# Patient Record
Sex: FEMALE | Race: ASIAN | NOT HISPANIC OR LATINO
[De-identification: names, ages, dates, MRNs, and addresses within clinical notes are randomized per-mention and may not be internally consistent; named-entity substitution may affect disease eponyms.]

---

## 2022-09-14 ENCOUNTER — APPOINTMENT (OUTPATIENT)
Dept: CARDIOLOGY | Facility: CLINIC | Age: 62
End: 2022-09-14
Payer: MEDICAID

## 2022-09-14 ENCOUNTER — NON-APPOINTMENT (OUTPATIENT)
Age: 62
End: 2022-09-14

## 2022-09-14 VITALS
HEART RATE: 76 BPM | RESPIRATION RATE: 16 BRPM | OXYGEN SATURATION: 96 % | HEIGHT: 61.42 IN | DIASTOLIC BLOOD PRESSURE: 75 MMHG | BODY MASS INDEX: 19.76 KG/M2 | TEMPERATURE: 97.8 F | WEIGHT: 106 LBS | SYSTOLIC BLOOD PRESSURE: 120 MMHG

## 2022-09-14 DIAGNOSIS — Z86.39 PERSONAL HISTORY OF OTHER ENDOCRINE, NUTRITIONAL AND METABOLIC DISEASE: ICD-10-CM

## 2022-09-14 DIAGNOSIS — R73.03 PREDIABETES.: ICD-10-CM

## 2022-09-14 DIAGNOSIS — R00.2 PALPITATIONS: ICD-10-CM

## 2022-09-14 PROBLEM — Z00.00 ENCOUNTER FOR PREVENTIVE HEALTH EXAMINATION: Status: ACTIVE | Noted: 2022-09-14

## 2022-09-14 PROCEDURE — 99203 OFFICE O/P NEW LOW 30 MIN: CPT | Mod: 25

## 2022-09-14 PROCEDURE — 93000 ELECTROCARDIOGRAM COMPLETE: CPT

## 2022-09-14 RX ORDER — MELOXICAM 15 MG/1
15 TABLET ORAL
Refills: 0 | Status: ACTIVE | COMMUNITY

## 2022-09-19 ENCOUNTER — APPOINTMENT (OUTPATIENT)
Dept: UROLOGY | Facility: CLINIC | Age: 62
End: 2022-09-19

## 2022-09-27 ENCOUNTER — FORM ENCOUNTER (OUTPATIENT)
Age: 62
End: 2022-09-27

## 2022-09-28 DIAGNOSIS — I48.0 PAROXYSMAL ATRIAL FIBRILLATION: ICD-10-CM

## 2022-09-28 RX ORDER — METOPROLOL SUCCINATE 25 MG/1
25 TABLET, EXTENDED RELEASE ORAL DAILY
Qty: 90 | Refills: 1 | Status: ACTIVE | COMMUNITY
Start: 2022-09-28 | End: 1900-01-01

## 2022-09-28 NOTE — ASSESSMENT
[FreeTextEntry1] : 62 year old woman with history of thyroid nodules and pre-DM who was referred for palpitations.\par \par Pt states that she has had occasional palpitations for the past 3-4 years. However, more recently, she has been feeling them more. She states that it occurs randomly, a few times/week. When it happens, it lasts for approximately 30 seconds and she feels her HR going fast. This is associated with some mild visual blurriness that slowly improves. No chest pain, SOB or other associated symptoms.\par \par She takes vitamins but denies other medications. Not a smoker. No drug use.\par \par 1) Palpitations, diagnosed pAF on Event monitor\par - TSH and electrolytes within normal limits 5/2022\par - given not a daily occurrence, I advised 72 hour event monitor to assess for ectopy/arrhythmia - results reviewed : overall rhythm is NSR (, avg 79 bpm) but on 9/14/22 at 3:52PM till 3:53PM, there was one episode of pAF lasting 1 minute and 1 second (highest rate 182 bpm), and then 4:09PM an episode of pAF lasting 3.2 seconds. Patient was symptomatic at that time. Otherwise, there was rare 0.02% of PACs.\par - I called patient to discuss above result: her [id number] is 1 for female, no clear indication to start AC at this time. I advised pt to return to clinic to obtain TTE and discuss consideration for AC but she is returning to [place] tomorrow. I Rx'ed metoprolol to her pharmacy and advised her to see cardiology in [place] for close f/u.\par \par 2) Follow-up, when pt returns from [place]

## 2022-09-28 NOTE — HISTORY OF PRESENT ILLNESS
[FreeTextEntry1] : 62 year old woman with history of thyroid nodules and pre-DM who was referred for palpitations.\par \par Pt states that she has had occasional palpitations for the past 3-4 years. However, more recently, she has been feeling them more. She states that it occurs randomly, a few times/week. When it happens, it lasts for approximately 30 seconds and she feels her HR going fast. This is associated with some mild visual blurriness that slowly improves. No chest pain, SOB or other associated symptoms.\par \par She takes vitamins but denies other medications. Not a smoker. No drug use.

## 2022-09-29 ENCOUNTER — FORM ENCOUNTER (OUTPATIENT)
Age: 62
End: 2022-09-29